# Patient Record
Sex: FEMALE | Race: BLACK OR AFRICAN AMERICAN | NOT HISPANIC OR LATINO | Employment: UNEMPLOYED | URBAN - METROPOLITAN AREA
[De-identification: names, ages, dates, MRNs, and addresses within clinical notes are randomized per-mention and may not be internally consistent; named-entity substitution may affect disease eponyms.]

---

## 2021-01-12 PROBLEM — J32.9 SINUSITIS: Status: ACTIVE | Noted: 2021-01-12

## 2021-01-12 PROBLEM — I10 HYPERTENSION: Status: ACTIVE | Noted: 2021-01-12

## 2021-01-12 PROBLEM — F31.9 BIPOLAR DISORDER: Status: ACTIVE | Noted: 2021-01-12

## 2021-05-12 ENCOUNTER — PATIENT MESSAGE (OUTPATIENT)
Dept: RESEARCH | Facility: HOSPITAL | Age: 41
End: 2021-05-12

## 2021-09-30 PROBLEM — L02.419 ARMPIT ABSCESS: Status: ACTIVE | Noted: 2021-09-30

## 2021-09-30 PROBLEM — Z76.89 ENCOUNTER FOR INCISION AND DRAINAGE PROCEDURE: Status: ACTIVE | Noted: 2021-09-30

## 2023-11-15 ENCOUNTER — PATIENT MESSAGE (OUTPATIENT)
Dept: ADMINISTRATIVE | Facility: HOSPITAL | Age: 43
End: 2023-11-15

## 2025-05-19 ENCOUNTER — TELEPHONE (OUTPATIENT)
Dept: OTOLARYNGOLOGY | Facility: CLINIC | Age: 45
End: 2025-05-19
Payer: MEDICARE

## 2025-05-19 NOTE — TELEPHONE ENCOUNTER
Ryanm for patient to call the office, as per ms greene patient access rep we do not take her insurance at this location. Will cancel the appointment since two voicemail have been sent and will send a portal message.